# Patient Record
Sex: FEMALE | Race: BLACK OR AFRICAN AMERICAN | NOT HISPANIC OR LATINO | URBAN - METROPOLITAN AREA
[De-identification: names, ages, dates, MRNs, and addresses within clinical notes are randomized per-mention and may not be internally consistent; named-entity substitution may affect disease eponyms.]

---

## 2019-11-30 ENCOUNTER — HOSPITAL ENCOUNTER (EMERGENCY)
Facility: OTHER | Age: 19
Discharge: HOME | End: 2019-12-01
Attending: EMERGENCY MEDICINE
Payer: MEDICAID

## 2019-11-30 VITALS
TEMPERATURE: 98 F | RESPIRATION RATE: 18 BRPM | BODY MASS INDEX: 24.11 KG/M2 | SYSTOLIC BLOOD PRESSURE: 123 MMHG | HEIGHT: 62 IN | WEIGHT: 131 LBS | DIASTOLIC BLOOD PRESSURE: 58 MMHG | OXYGEN SATURATION: 99 % | HEART RATE: 98 BPM

## 2019-11-30 DIAGNOSIS — H60.502 ACUTE OTITIS EXTERNA OF LEFT EAR, UNSPECIFIED TYPE: ICD-10-CM

## 2019-11-30 DIAGNOSIS — H60.501 ACUTE OTITIS EXTERNA OF RIGHT EAR, UNSPECIFIED TYPE: Primary | ICD-10-CM

## 2019-11-30 PROCEDURE — 81025 URINE PREGNANCY TEST: CPT | Performed by: NURSE PRACTITIONER

## 2019-11-30 PROCEDURE — 99283 EMERGENCY DEPT VISIT LOW MDM: CPT

## 2019-11-30 RX ORDER — CIPROFLOXACIN AND DEXAMETHASONE 3; 1 MG/ML; MG/ML
4 SUSPENSION/ DROPS AURICULAR (OTIC)
Status: COMPLETED | OUTPATIENT
Start: 2019-12-01 | End: 2019-12-01

## 2019-11-30 RX ORDER — IBUPROFEN 400 MG/1
800 TABLET ORAL
Status: COMPLETED | OUTPATIENT
Start: 2019-12-01 | End: 2019-12-01

## 2019-12-01 PROBLEM — H60.501 ACUTE OTITIS EXTERNA OF RIGHT EAR: Status: ACTIVE | Noted: 2019-12-01

## 2019-12-01 PROBLEM — H60.502 ACUTE OTITIS EXTERNA OF LEFT EAR: Status: ACTIVE | Noted: 2019-12-01

## 2019-12-01 LAB
B-HCG UR QL: NEGATIVE
CTP QC/QA: YES

## 2019-12-01 PROCEDURE — 25000003 PHARM REV CODE 250: Performed by: NURSE PRACTITIONER

## 2019-12-01 RX ORDER — CIPROFLOXACIN AND DEXAMETHASONE 3; 1 MG/ML; MG/ML
4 SUSPENSION/ DROPS AURICULAR (OTIC) 2 TIMES DAILY
Qty: 7.5 ML | Refills: 0 | Status: SHIPPED | OUTPATIENT
Start: 2019-12-01 | End: 2019-12-08

## 2019-12-01 RX ADMIN — CIPROFLOXACIN AND DEXAMETHASONE 4 DROP: 3; 1 SUSPENSION/ DROPS AURICULAR (OTIC) at 12:12

## 2019-12-01 RX ADMIN — IBUPROFEN 800 MG: 400 TABLET, FILM COATED ORAL at 12:12

## 2019-12-01 NOTE — ED TRIAGE NOTES
Patient to ED with complaint of sinus infection onset Tuesday.  Pt states she was seen at her Dr yesterday was tested for flu and strep was negative.  States she had sinus infection.  Today approx 2 hours ago started having really bad right ear pain.  Patient still complaining of sore throat.   Denies running any fever.  Patient is awake and alert RR easy non labored   Mom at bedside with patient.

## 2019-12-01 NOTE — ED PROVIDER NOTES
Encounter Date: 11/30/2019       History     Chief Complaint   Patient presents with    URI     since Tuesday, earache started today, denies fever but has sore throat, tested negative for strep yesterday     19-year-old female with presents the emergency room with congestion, runny nose, body aches and cough that began on Tuesday.  She woke up this morning with intense right ear pain. She was seen by the pediatrician yesterday who tested her for strep and flu and she was told was negative. They told her was a virus and to treat the symptoms with over-the-counter medication.  Patient denies any fever, chest pain or shortness of breath.    The history is provided by the patient and a parent.     Review of patient's allergies indicates:  No Known Allergies  Past Medical History:   Diagnosis Date    Asthma      Past Surgical History:   Procedure Laterality Date    SINUS SURGERY      SINUS SURGERY       History reviewed. No pertinent family history.  Social History     Tobacco Use    Smoking status: Never Smoker    Smokeless tobacco: Never Used   Substance Use Topics    Alcohol use: Yes     Comment: social    Drug use: Never     Review of Systems   Constitutional: Negative for chills and fever.   HENT: Positive for congestion, ear pain (Right) and postnasal drip. Negative for sore throat.    Respiratory: Positive for cough. Negative for shortness of breath.    Cardiovascular: Negative for chest pain.   Gastrointestinal: Negative for abdominal pain and nausea.   Genitourinary: Negative for dysuria.   Musculoskeletal: Positive for myalgias ( body aches). Negative for back pain.   Skin: Negative for rash.   Neurological: Negative for weakness.   Hematological: Does not bruise/bleed easily.   All other systems reviewed and are negative.      Physical Exam     Initial Vitals [11/30/19 2328]   BP Pulse Resp Temp SpO2   (!) 123/58 98 18 98.4 °F (36.9 °C) 99 %      MAP       --         Physical Exam    Nursing note and  vitals reviewed.  Constitutional: She appears well-developed and well-nourished.   HENT:   Head: Normocephalic and atraumatic.   Right Ear: There is drainage, swelling and tenderness. Tympanic membrane is not injected and not scarred. A middle ear effusion (Clear) is present.   Left Ear: There is swelling and tenderness. No drainage. Tympanic membrane is not injected and not scarred. A middle ear effusion ( clear) is present.   No mastoid tenderness   Eyes: Conjunctivae and EOM are normal. Pupils are equal, round, and reactive to light.   Neck: Normal range of motion.   Cardiovascular: Normal rate, regular rhythm, normal heart sounds and intact distal pulses. Exam reveals no gallop and no friction rub.    No murmur heard.  Pulmonary/Chest: Breath sounds normal. No respiratory distress. She has no wheezes. She has no rhonchi. She has no rales. She exhibits no tenderness.   Lymphadenopathy:        Head (right side): Tonsillar and preauricular adenopathy present. No posterior auricular adenopathy present.        Head (left side): No preauricular adenopathy present.     She has cervical adenopathy.        Right cervical: Superficial cervical adenopathy present.   Neurological: She is alert and oriented to person, place, and time. She has normal strength. GCS score is 15. GCS eye subscore is 4. GCS verbal subscore is 5. GCS motor subscore is 6.       ED Course   Procedures  Labs Reviewed   POCT URINE PREGNANCY           Medical Decision Making:   Initial Assessment:   19-year-old female which presents the emergency room with right ear pain that began this morning.  She does have associated URI symptoms.  Differential Diagnosis:   Otitis externa, viral URI with cough, otitis media  Clinical Tests:   Lab Tests: Ordered and Reviewed  The following lab test(s) were unremarkable: UPT  ED Management:  Patient examined and noted to have bilateral otitis externa.  She does have preauricular lymphadenopathy and tonsillar  lymphadenopathy on the right.  Patient given Ciprodex drops while in the ED and a ibuprofen 800 mg.  She has been advised to use the Ciprodex drops twice a day per the prescription and to take ibuprofen along with Tylenol to help with the pain.  She has also been advised to place a warm compress to the ear to help with the pain. Patient given strict return precautions and voiced understanding of all discharge instructions. Pt was stable at discharge.                        ED Course as of Dec 01 0029   Sat Nov 30, 2019 2348 BP(!): 123/58 [AT]   2348 Temp: 98.4 °F (36.9 °C) [AT]   2348 Temp src: Oral [AT]   2348 Pulse: 98 [AT]   2348 Resp: 18 [AT]   2348 SpO2: 99 % [AT]   Sun Dec 01, 2019   0010 Preg Test, Ur: Negative [AT]      ED Course User Index  [AT] LANG Johnson                Clinical Impression:       ICD-10-CM ICD-9-CM   1. Acute otitis externa of right ear, unspecified type H60.501 380.10   2. Acute otitis externa of left ear, unspecified type H60.502 380.10                             LANG Johnson  12/01/19 0029